# Patient Record
Sex: FEMALE | NOT HISPANIC OR LATINO
[De-identification: names, ages, dates, MRNs, and addresses within clinical notes are randomized per-mention and may not be internally consistent; named-entity substitution may affect disease eponyms.]

---
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
## 2023-06-22 ENCOUNTER — RESULT REVIEW (OUTPATIENT)
Age: 18
End: 2023-06-22

## 2023-06-22 ENCOUNTER — APPOINTMENT (OUTPATIENT)
Dept: HEMATOLOGY ONCOLOGY | Facility: CLINIC | Age: 18
End: 2023-06-22

## 2023-06-22 VITALS
DIASTOLIC BLOOD PRESSURE: 60 MMHG | TEMPERATURE: 97.4 F | HEART RATE: 78 BPM | OXYGEN SATURATION: 100 % | WEIGHT: 137 LBS | BODY MASS INDEX: 20.76 KG/M2 | SYSTOLIC BLOOD PRESSURE: 101 MMHG | RESPIRATION RATE: 16 BRPM | HEIGHT: 68 IN

## 2023-06-27 ENCOUNTER — APPOINTMENT (OUTPATIENT)
Dept: HEMATOLOGY ONCOLOGY | Facility: CLINIC | Age: 18
End: 2023-06-27
Payer: COMMERCIAL

## 2023-06-27 ENCOUNTER — RESULT REVIEW (OUTPATIENT)
Age: 18
End: 2023-06-27

## 2023-06-27 VITALS
TEMPERATURE: 97.8 F | DIASTOLIC BLOOD PRESSURE: 61 MMHG | HEIGHT: 68 IN | SYSTOLIC BLOOD PRESSURE: 91 MMHG | HEART RATE: 69 BPM | BODY MASS INDEX: 20.34 KG/M2 | OXYGEN SATURATION: 99 % | RESPIRATION RATE: 16 BRPM | WEIGHT: 134.25 LBS

## 2023-06-27 PROCEDURE — 99213 OFFICE O/P EST LOW 20 MIN: CPT

## 2023-07-03 NOTE — ASSESSMENT
[FreeTextEntry1] : # family history of hemochromatosis - Mom with compound heterozygote H63D and C282Y\par patient with H63D heterozygous\par ferritin 20->19->15 - DAWOOD->will rediscuss risk/benefit of iron supplementation w/ Dr. Bojorquez.\par TSH wnl\par She does not drink alcohol\par 12/19/22 MRI abd w/ hepatic iron concentration 15 umol/g (range <36 umol/g).\par 12/19/22 DEXA scan to assess for osteoporosis - normal \par \par #SOB \par previously assessed by PCP.\par on clinical exam no abnormality\par O2 sat 99%\par 6/27/23 CXR today for ongoing symptoms->unremarkable.\par \par RTC in 6 months with cbc with dfif, cmp, iron, ferritin.

## 2023-07-03 NOTE — REVIEW OF SYSTEMS
[Negative] : Allergic/Immunologic [Wheezing] : no wheezing [Cough] : no cough [SOB on Exertion] : shortness of breath during exertion [FreeTextEntry6] : intermittent [FreeTextEntry9] : back pain

## 2023-07-03 NOTE — HISTORY OF PRESENT ILLNESS
[de-identified] : Ms. Bethea is a 17 year old woman who presents for initial consultation of family history for hemochromatosis.\par \par She reports dizziness/lightheadedness but reports not drinking enough water; daily SOB at rest and with exertion\par \par Age of Menarche: 9\par LMP: 8/1/2022\par OCP/HRT: denies\par G0P\par \par Current Sophomore at [employer]  [de-identified] : Patient comes today with her mother for follow up.\par Continues w/ intermittent SOB on exertion.\par Denies any chest pain/palpitations.\par Ferritin 20->19->15, slowly downtrending.

## 2023-12-12 ENCOUNTER — APPOINTMENT (OUTPATIENT)
Dept: HEMATOLOGY ONCOLOGY | Facility: CLINIC | Age: 18
End: 2023-12-12
Payer: COMMERCIAL

## 2023-12-12 ENCOUNTER — RESULT REVIEW (OUTPATIENT)
Age: 18
End: 2023-12-12

## 2023-12-12 VITALS
RESPIRATION RATE: 16 BRPM | BODY MASS INDEX: 21.07 KG/M2 | HEIGHT: 68 IN | SYSTOLIC BLOOD PRESSURE: 115 MMHG | WEIGHT: 139 LBS | OXYGEN SATURATION: 99 % | HEART RATE: 69 BPM | TEMPERATURE: 98.8 F | DIASTOLIC BLOOD PRESSURE: 75 MMHG

## 2023-12-12 DIAGNOSIS — R06.02 SHORTNESS OF BREATH: ICD-10-CM

## 2023-12-12 PROCEDURE — 99213 OFFICE O/P EST LOW 20 MIN: CPT

## 2023-12-12 RX ORDER — MULTIVITAMIN
TABLET ORAL
Refills: 0 | Status: COMPLETED | COMMUNITY
Start: 2022-08-12 | End: 2023-12-12

## 2024-06-06 ENCOUNTER — RESULT REVIEW (OUTPATIENT)
Age: 19
End: 2024-06-06

## 2024-06-06 ENCOUNTER — APPOINTMENT (OUTPATIENT)
Dept: HEMATOLOGY ONCOLOGY | Facility: CLINIC | Age: 19
End: 2024-06-06

## 2024-06-06 VITALS
HEIGHT: 68 IN | SYSTOLIC BLOOD PRESSURE: 86 MMHG | DIASTOLIC BLOOD PRESSURE: 65 MMHG | HEART RATE: 76 BPM | OXYGEN SATURATION: 95 % | WEIGHT: 134.31 LBS | TEMPERATURE: 97.1 F | RESPIRATION RATE: 16 BRPM | BODY MASS INDEX: 20.35 KG/M2

## 2024-06-11 ENCOUNTER — APPOINTMENT (OUTPATIENT)
Dept: HEMATOLOGY ONCOLOGY | Facility: CLINIC | Age: 19
End: 2024-06-11
Payer: COMMERCIAL

## 2024-06-11 VITALS
SYSTOLIC BLOOD PRESSURE: 100 MMHG | TEMPERATURE: 98.2 F | DIASTOLIC BLOOD PRESSURE: 65 MMHG | RESPIRATION RATE: 16 BRPM | HEIGHT: 68 IN | BODY MASS INDEX: 20.16 KG/M2 | WEIGHT: 133 LBS | HEART RATE: 70 BPM | OXYGEN SATURATION: 98 %

## 2024-06-11 DIAGNOSIS — E61.1 IRON DEFICIENCY: ICD-10-CM

## 2024-06-11 DIAGNOSIS — E83.119 HEMOCHROMATOSIS, UNSPECIFIED: ICD-10-CM

## 2024-06-11 PROCEDURE — 99213 OFFICE O/P EST LOW 20 MIN: CPT

## 2024-06-11 NOTE — HISTORY OF PRESENT ILLNESS
[de-identified] : Ms. Bethea is a 17 year old woman who presents for initial consultation of family history for hemochromatosis.\par  \par  She reports dizziness/lightheadedness but reports not drinking enough water; daily SOB at rest and with exertion\par  \par  Age of Menarche: 9\par  LMP: 8/1/2022\par  OCP/HRT: denies\par  G0P\par  \par  Current Sophomore at [employer]  [de-identified] : Patient comes today with her mother for follow up. Was taking oral iron but stopped on her own. Does not remember exact timing of when she stopped but likely around January. Denies increased lethargy, chest pain/palpitations. Denies having heavy menses.

## 2024-06-11 NOTE — REVIEW OF SYSTEMS
[SOB on Exertion] : shortness of breath during exertion [Negative] : Respiratory [Wheezing] : no wheezing [Cough] : no cough [FreeTextEntry6] : intermittent

## 2024-06-11 NOTE — ASSESSMENT
[FreeTextEntry1] : # family history of hemochromatosis - Mom with compound heterozygote H63D and C282Y patient with H63D heterozygous ferritin 20->19->15 - [facility]WO[facility] - no need for TP TSH wnl She does not drink alcohol 12/19/22 MRI abd w/ hepatic iron concentration 15 umol/g (range <36 umol/g). No evidence of iron overload. 12/19/22 DEXA scan to assess for osteoporosis - normal 12/12/23 - vs and labs reviewed. labs drawn in office today. hgb 14.7 - stopped iron supplements 2 weeks ago. pending iron and ferritin. if improvement will continue oral supplementation. If there is no improvement will consifer venofer 200 mg IV x 3-5 doses. reviewed side effects of venofer 6/24 - vs reviewed and labs reviewed from 6/9/24 - cbc, cmp, iron and ferritin wnl. continue to monitor off iron. She should continue taking her multivitamin  #SOB - resolved  6 months with cbc with dfif, cmp, iron, ferritin and tele a few days later

## 2024-07-10 ENCOUNTER — NON-APPOINTMENT (OUTPATIENT)
Age: 19
End: 2024-07-10

## 2024-07-11 ENCOUNTER — NON-APPOINTMENT (OUTPATIENT)
Age: 19
End: 2024-07-11

## 2024-07-11 ENCOUNTER — APPOINTMENT (OUTPATIENT)
Dept: OBGYN | Facility: CLINIC | Age: 19
End: 2024-07-11
Payer: COMMERCIAL

## 2024-07-11 VITALS
SYSTOLIC BLOOD PRESSURE: 100 MMHG | WEIGHT: 132 LBS | HEIGHT: 68 IN | BODY MASS INDEX: 20 KG/M2 | DIASTOLIC BLOOD PRESSURE: 60 MMHG

## 2024-07-11 DIAGNOSIS — Z12.4 ENCOUNTER FOR SCREENING FOR MALIGNANT NEOPLASM OF CERVIX: ICD-10-CM

## 2024-07-11 DIAGNOSIS — Z01.419 ENCOUNTER FOR GYNECOLOGICAL EXAMINATION (GENERAL) (ROUTINE) W/OUT ABNORMAL FINDINGS: ICD-10-CM

## 2024-07-11 PROCEDURE — 99385 PREV VISIT NEW AGE 18-39: CPT

## 2024-07-15 LAB — CYTOLOGY CVX/VAG DOC THIN PREP: NORMAL

## 2024-08-15 ENCOUNTER — TRANSCRIPTION ENCOUNTER (OUTPATIENT)
Age: 19
End: 2024-08-15

## 2024-12-19 ENCOUNTER — LABORATORY RESULT (OUTPATIENT)
Age: 19
End: 2024-12-19

## 2024-12-19 ENCOUNTER — RESULT REVIEW (OUTPATIENT)
Age: 19
End: 2024-12-19

## 2024-12-19 ENCOUNTER — APPOINTMENT (OUTPATIENT)
Dept: HEMATOLOGY ONCOLOGY | Facility: CLINIC | Age: 19
End: 2024-12-19

## 2024-12-23 ENCOUNTER — APPOINTMENT (OUTPATIENT)
Dept: HEMATOLOGY ONCOLOGY | Facility: CLINIC | Age: 19
End: 2024-12-23

## 2025-06-24 ENCOUNTER — RESULT REVIEW (OUTPATIENT)
Age: 20
End: 2025-06-24

## 2025-06-24 ENCOUNTER — APPOINTMENT (OUTPATIENT)
Dept: HEMATOLOGY ONCOLOGY | Facility: CLINIC | Age: 20
End: 2025-06-24

## 2025-06-24 VITALS
TEMPERATURE: 97 F | HEART RATE: 101 BPM | HEIGHT: 68 IN | OXYGEN SATURATION: 97 % | BODY MASS INDEX: 19.43 KG/M2 | DIASTOLIC BLOOD PRESSURE: 61 MMHG | SYSTOLIC BLOOD PRESSURE: 92 MMHG | RESPIRATION RATE: 16 BRPM | WEIGHT: 128.19 LBS